# Patient Record
Sex: MALE | Race: WHITE | NOT HISPANIC OR LATINO | URBAN - METROPOLITAN AREA
[De-identification: names, ages, dates, MRNs, and addresses within clinical notes are randomized per-mention and may not be internally consistent; named-entity substitution may affect disease eponyms.]

---

## 2018-01-05 DIAGNOSIS — J11.1 INFLUENZA: Primary | ICD-10-CM

## 2018-01-05 RX ORDER — OSELTAMIVIR PHOSPHATE 75 MG/1
75 CAPSULE ORAL 2 TIMES DAILY
Qty: 10 CAPSULE | Refills: 0 | OUTPATIENT
Start: 2018-01-05 | End: 2020-05-25

## 2020-05-25 PROCEDURE — U0003 INFECTIOUS AGENT DETECTION BY NUCLEIC ACID (DNA OR RNA); SEVERE ACUTE RESPIRATORY SYNDROME CORONAVIRUS 2 (SARS-COV-2) (CORONAVIRUS DISEASE [COVID-19]), AMPLIFIED PROBE TECHNIQUE, MAKING USE OF HIGH THROUGHPUT TECHNOLOGIES AS DESCRIBED BY CMS-2020-01-R: HCPCS | Performed by: FAMILY MEDICINE

## 2020-05-28 ENCOUNTER — TELEPHONE (OUTPATIENT)
Dept: URGENT CARE | Facility: CLINIC | Age: 25
End: 2020-05-28

## 2020-05-28 NOTE — TELEPHONE ENCOUNTER
Pt returned call to receive cv-19 test results. Informed him the test came back negative, and he stated he had been feeling much better over the last couple days. I informed the Pt that we still recommend self-quarantine for the remainder of the 10 days from onset of Sx and at least 3 days without fever.  5/28  LINH

## 2021-08-04 ENCOUNTER — TELEPHONE (OUTPATIENT)
Dept: FAMILY MEDICINE CLINIC | Facility: CLINIC | Age: 26
End: 2021-08-04

## 2021-08-04 NOTE — TELEPHONE ENCOUNTER
Caller: Karena Carmen    Relationship: Mother    Best call back number: [phone]    What is the best time to reach you: ANYTIME     Who are you requesting to speak with (clinical staff, provider,  specific staff member): CLINICAL STAFF     What was the call regarding: PATIENT IS NEEDING IMMUNIZATION RECORDS FOR SCHOOL. IT HAS BEEN A WHILE SINCE HE HAS LAST BEEN SEEN HERE AND HIS MOM HAS OTHER QUESTIONS ABOUT HIS IMMUNIZATIONS AS WELL.     Do you require a callback: YES